# Patient Record
Sex: MALE | Race: WHITE | NOT HISPANIC OR LATINO | Employment: FULL TIME | URBAN - METROPOLITAN AREA
[De-identification: names, ages, dates, MRNs, and addresses within clinical notes are randomized per-mention and may not be internally consistent; named-entity substitution may affect disease eponyms.]

---

## 2021-08-17 ENCOUNTER — HOSPITAL ENCOUNTER (EMERGENCY)
Facility: HOSPITAL | Age: 51
Discharge: HOME | End: 2021-08-17
Attending: EMERGENCY MEDICINE | Admitting: EMERGENCY MEDICINE

## 2021-08-17 VITALS
TEMPERATURE: 98.4 F | SYSTOLIC BLOOD PRESSURE: 139 MMHG | RESPIRATION RATE: 18 BRPM | HEART RATE: 95 BPM | BODY MASS INDEX: 31.39 KG/M2 | WEIGHT: 195.33 LBS | OXYGEN SATURATION: 96 % | DIASTOLIC BLOOD PRESSURE: 93 MMHG | HEIGHT: 66 IN

## 2021-08-17 DIAGNOSIS — K61.0 PERIANAL ABSCESS: Primary | ICD-10-CM

## 2021-08-17 PROCEDURE — 99282 EMERGENCY DEPT VISIT SF MDM: CPT

## 2021-08-17 RX ORDER — AMOXICILLIN AND CLAVULANATE POTASSIUM 875; 125 MG/1; MG/1
1 TABLET, FILM COATED ORAL EVERY 12 HOURS
Qty: 14 TABLET | Refills: 0 | Status: SHIPPED | OUTPATIENT
Start: 2021-08-17

## 2021-08-17 NOTE — ED PROVIDER NOTES
Subjective   History of Present Illness  51-year-old male presents with perianal pain.  He states he started with symptoms 2 days ago.  He reports he had cyst that needed to be I&D about 12 years ago.  He has had no fever abdominal pain or other complaints.  He states pain constant moderate in nature.  Review of Systems  Negative for fever diabetes  No past medical history on file.  Hypertension  Allergies   Allergen Reactions   • Sulfa Antibiotics Dizziness       No past surgical history on file.    No family history on file.    Social History     Socioeconomic History   • Marital status: Single     Spouse name: Not on file   • Number of children: Not on file   • Years of education: Not on file   • Highest education level: Not on file   Only home medication losartan        Objective   Physical Exam  51-year-old male awake alert.  Generally well-developed well-nourished.  Examination of the perianal area.  He is noted to have tenderness induration just anterior to anus.  There is no significant surrounding tenderness.  Digital rectal exam no internal wall thickening.  Procedures     After discussion with patient elected to perform aspiration.  Area of maximal induration was prepped with Betadine infiltrated 1% Xylocaine with epinephrine aspiration was performed less than 1 cc of purulence was obtained.      ED Course                                           MDM  Patient's findings were discussed with him.  He was discussed with Dr. Groves.  He should follow-up with general surgery this week for repeat evaluation he was placed on Augmentin and advised to do warm soaks 3 times a day.  He is aware that this could improve with aspiration and antibiotics alone or may require I&D.  Final diagnoses:   Perianal abscess       ED Disposition  ED Disposition     ED Disposition Condition Comment    Discharge Stable           Abdirashid Groves MD  [address] [place] [address]  [phone]    Schedule an  appointment as soon as possible for a visit   With available general surgeon this week         Medication List      New Prescriptions    amoxicillin-clavulanate 875-125 MG per tablet  Commonly known as: AUGMENTIN  Take 1 tablet by mouth Every 12 (Twelve) Hours.           Where to Get Your Medications      These medications were sent to [facility] [address] [place] [address], [place] - [address] AT [facility] [address] - [phone]  - [phone] FX  [address], [place] [address]    Phone: [phone]   · amoxicillin-clavulanate 875-125 MG per tablet          Dionicio Alexander MD  08/17/21 7844

## 2021-08-17 NOTE — DISCHARGE INSTRUCTIONS
Warm sitz bath 3 times a day, may use Tylenol or ibuprofen for pain, return increased pain fever or as needed

## 2021-08-17 NOTE — ED NOTES
Patient has homar rectal abscess that started 2 days ago. Area is painful. Has same thing about 12 years ago     Patria Horvath RN  08/17/21 3106

## 2021-08-24 NOTE — PROGRESS NOTES
"Chief Complaint  New Patient (Perianal Cyst)    Subjective          Ferdinand Crowell presents to [facility]  History of Present Illness     51-year-old gentleman, no significant past medical history, has had perirectal abscesses in the past, but has been many years since his last one, who presents to my office after he was seen in the emergency department for perianal pain.  He states he developed perianal fullness as well as achy pain last week, no drainage this was similar to his previous abscesses so he went to the emergency department.  He was diagnosed with perianal abscess, but it was too small to drain at that time, an aspiration was attempted with less than 1 mL of purulent fluid removed. He was prescribed Augmentin and sent to my office.  He is due to finish his Augmentin course tomorrow.  Before this acute change, he does describe infrequent blood-tinged/clear drainage from his anus, but no rectal pain or fullness.  His pain and fullness have resolved since his emergency department visit, no current drainage.  He has never had a screening colonoscopy, he does have a family history of colon cancer, no blood in his stools, no unexplained weight loss, no abdominal pain.    Objective   Vital Signs:   BP (!) 157/105 (Cuff Size: Adult)   Pulse 67   Temp 98 °F (36.7 °C) (Infrared)   Ht 167.6 cm (66\")   Wt 88.8 kg (195 lb 12.8 oz)   SpO2 96%   BMI 31.60 kg/m²     Physical Exam  Constitutional:       General: He is not in acute distress.     Appearance: Normal appearance. He is not ill-appearing.   HENT:      Head: Normocephalic and atraumatic.      Right Ear: External ear normal.      Left Ear: External ear normal.   Eyes:      Extraocular Movements: Extraocular movements intact.      Conjunctiva/sclera: Conjunctivae normal.   Cardiovascular:      Rate and Rhythm: Normal rate and regular rhythm.   Pulmonary:      Effort: Pulmonary effort is normal. No respiratory distress. "   Abdominal:      General: There is no distension.      Palpations: Abdomen is soft.      Tenderness: There is no abdominal tenderness.   Genitourinary:     Comments: Around the 9 o'clock position on patient's left side, he appears to have an anal fistula tract.  No current drainage, no erythema, nontender does not appear to be acutely infected.  No mass on digital rectal exam, no bleeding, no fissure no significant hemorrhoidal disease.  Musculoskeletal:         General: No swelling or deformity.   Skin:     General: Skin is warm and dry.   Neurological:      Mental Status: He is alert and oriented to person, place, and time. Mental status is at baseline.        Result Review :   The following data was reviewed by: Freddy Espinosa MD on 08/25/2021:  I reviewed patient's labs from his emergency department visit, the provider note from the emergency department visit, the procedure note from his emergency department visit             Assessment and Plan    Diagnoses and all orders for this visit:    1. Anal fistula (Primary)    Other orders  -     amoxicillin-clavulanate (Augmentin) 875-125 MG per tablet; Take 1 tablet by mouth 2 (Two) Times a Day for 7 days.  Dispense: 14 tablet; Refill: 0        51-year-old gentleman with left-sided anal fistula, no current signs of infection but he is finishing course of Augmentin.  I suspect it was developing an infection earlier this week.  Patient about to go to [place] for a week, will prescribe second course of Augmentin in case he develops repeat symptoms while on vacation.  Counseled patient to only take this if his symptoms of infection return.  Discussed performing rectal exam under anesthesia to search for the internal opening of his anal fistula with seton placement if it involves the sphincter, versus fistulotomy if this is a superficial fistula.  Patient states that he is rarely symptomatic, and would rather proceed with nonoperative treatment at this time unless he  develops further infections.  Patient is due for screening colonoscopy, he has a family history of cancer and is over the age of 50, he states he will call the office to schedule his colonoscopy after his vacation.      Follow Up   Return if symptoms worsen or fail to improve, for Patient will call to schedule screening colonoscopy.  Patient was given instructions and counseling regarding his condition or for health maintenance advice. Please see specific information pulled into the AVS if appropriate.

## 2021-08-25 ENCOUNTER — OFFICE VISIT (OUTPATIENT)
Dept: SURGERY | Facility: CLINIC | Age: 51
End: 2021-08-25

## 2021-08-25 VITALS
DIASTOLIC BLOOD PRESSURE: 105 MMHG | TEMPERATURE: 98 F | HEIGHT: 66 IN | BODY MASS INDEX: 31.47 KG/M2 | HEART RATE: 67 BPM | SYSTOLIC BLOOD PRESSURE: 157 MMHG | OXYGEN SATURATION: 96 % | WEIGHT: 195.8 LBS

## 2021-08-25 DIAGNOSIS — K60.3 ANAL FISTULA: Primary | ICD-10-CM

## 2021-08-25 PROCEDURE — 99204 OFFICE O/P NEW MOD 45 MIN: CPT | Performed by: STUDENT IN AN ORGANIZED HEALTH CARE EDUCATION/TRAINING PROGRAM

## 2021-08-25 RX ORDER — LOSARTAN POTASSIUM 25 MG/1
25 TABLET ORAL DAILY
COMMUNITY

## 2021-08-25 RX ORDER — AMOXICILLIN AND CLAVULANATE POTASSIUM 875; 125 MG/1; MG/1
1 TABLET, FILM COATED ORAL 2 TIMES DAILY
Qty: 14 TABLET | Refills: 0 | Status: SHIPPED | OUTPATIENT
Start: 2021-08-25 | End: 2021-09-01

## 2021-08-31 PROBLEM — K60.3 ANAL FISTULA: Status: ACTIVE | Noted: 2021-08-31
